# Patient Record
Sex: FEMALE | Race: ASIAN | NOT HISPANIC OR LATINO | URBAN - METROPOLITAN AREA
[De-identification: names, ages, dates, MRNs, and addresses within clinical notes are randomized per-mention and may not be internally consistent; named-entity substitution may affect disease eponyms.]

---
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
## 2022-04-24 ENCOUNTER — EMERGENCY (EMERGENCY)
Facility: HOSPITAL | Age: 22
LOS: 0 days | Discharge: HOME | End: 2022-04-25
Attending: EMERGENCY MEDICINE
Payer: MEDICAID

## 2022-04-24 VITALS
SYSTOLIC BLOOD PRESSURE: 102 MMHG | DIASTOLIC BLOOD PRESSURE: 52 MMHG | RESPIRATION RATE: 18 BRPM | WEIGHT: 108.03 LBS | HEART RATE: 71 BPM | TEMPERATURE: 98 F | OXYGEN SATURATION: 99 % | HEIGHT: 65 IN

## 2022-04-24 DIAGNOSIS — N83.202 UNSPECIFIED OVARIAN CYST, LEFT SIDE: ICD-10-CM

## 2022-04-24 DIAGNOSIS — J45.909 UNSPECIFIED ASTHMA, UNCOMPLICATED: ICD-10-CM

## 2022-04-24 DIAGNOSIS — R10.30 LOWER ABDOMINAL PAIN, UNSPECIFIED: ICD-10-CM

## 2022-04-24 DIAGNOSIS — R11.0 NAUSEA: ICD-10-CM

## 2022-04-24 LAB
ALBUMIN SERPL ELPH-MCNC: 3.8 G/DL — SIGNIFICANT CHANGE UP (ref 3.3–5)
ALP SERPL-CCNC: 57 U/L — SIGNIFICANT CHANGE UP (ref 40–120)
ALT FLD-CCNC: 32 U/L — SIGNIFICANT CHANGE UP (ref 12–78)
ANION GAP SERPL CALC-SCNC: 8 MMOL/L — SIGNIFICANT CHANGE UP (ref 5–17)
APPEARANCE UR: CLEAR — SIGNIFICANT CHANGE UP
AST SERPL-CCNC: 18 U/L — SIGNIFICANT CHANGE UP (ref 15–37)
BASOPHILS # BLD AUTO: 0.04 K/UL — SIGNIFICANT CHANGE UP (ref 0–0.2)
BASOPHILS NFR BLD AUTO: 0.4 % — SIGNIFICANT CHANGE UP (ref 0–2)
BILIRUB SERPL-MCNC: 0.4 MG/DL — SIGNIFICANT CHANGE UP (ref 0.2–1.2)
BILIRUB UR-MCNC: NEGATIVE — SIGNIFICANT CHANGE UP
BUN SERPL-MCNC: 17 MG/DL — SIGNIFICANT CHANGE UP (ref 7–23)
CALCIUM SERPL-MCNC: 8.9 MG/DL — SIGNIFICANT CHANGE UP (ref 8.5–10.1)
CHLORIDE SERPL-SCNC: 108 MMOL/L — SIGNIFICANT CHANGE UP (ref 96–108)
CO2 SERPL-SCNC: 26 MMOL/L — SIGNIFICANT CHANGE UP (ref 22–31)
COLOR SPEC: YELLOW — SIGNIFICANT CHANGE UP
CREAT SERPL-MCNC: 0.77 MG/DL — SIGNIFICANT CHANGE UP (ref 0.5–1.3)
DIFF PNL FLD: NEGATIVE — SIGNIFICANT CHANGE UP
EGFR: 113 ML/MIN/1.73M2 — SIGNIFICANT CHANGE UP
EOSINOPHIL # BLD AUTO: 0.17 K/UL — SIGNIFICANT CHANGE UP (ref 0–0.5)
EOSINOPHIL NFR BLD AUTO: 1.5 % — SIGNIFICANT CHANGE UP (ref 0–6)
GLUCOSE SERPL-MCNC: 112 MG/DL — HIGH (ref 70–99)
GLUCOSE UR QL: NEGATIVE — SIGNIFICANT CHANGE UP
HCT VFR BLD CALC: 38.4 % — SIGNIFICANT CHANGE UP (ref 34.5–45)
HGB BLD-MCNC: 12.9 G/DL — SIGNIFICANT CHANGE UP (ref 11.5–15.5)
IMM GRANULOCYTES NFR BLD AUTO: 0.3 % — SIGNIFICANT CHANGE UP (ref 0–1.5)
KETONES UR-MCNC: NEGATIVE — SIGNIFICANT CHANGE UP
LEUKOCYTE ESTERASE UR-ACNC: ABNORMAL
LYMPHOCYTES # BLD AUTO: 19.3 % — SIGNIFICANT CHANGE UP (ref 13–44)
LYMPHOCYTES # BLD AUTO: 2.18 K/UL — SIGNIFICANT CHANGE UP (ref 1–3.3)
MCHC RBC-ENTMCNC: 31 PG — SIGNIFICANT CHANGE UP (ref 27–34)
MCHC RBC-ENTMCNC: 33.6 GM/DL — SIGNIFICANT CHANGE UP (ref 32–36)
MCV RBC AUTO: 92.3 FL — SIGNIFICANT CHANGE UP (ref 80–100)
MONOCYTES # BLD AUTO: 0.59 K/UL — SIGNIFICANT CHANGE UP (ref 0–0.9)
MONOCYTES NFR BLD AUTO: 5.2 % — SIGNIFICANT CHANGE UP (ref 2–14)
NEUTROPHILS # BLD AUTO: 8.29 K/UL — HIGH (ref 1.8–7.4)
NEUTROPHILS NFR BLD AUTO: 73.3 % — SIGNIFICANT CHANGE UP (ref 43–77)
NITRITE UR-MCNC: NEGATIVE — SIGNIFICANT CHANGE UP
PH UR: 6 — SIGNIFICANT CHANGE UP (ref 5–8)
PLATELET # BLD AUTO: 298 K/UL — SIGNIFICANT CHANGE UP (ref 150–400)
POTASSIUM SERPL-MCNC: 3.6 MMOL/L — SIGNIFICANT CHANGE UP (ref 3.5–5.3)
POTASSIUM SERPL-SCNC: 3.6 MMOL/L — SIGNIFICANT CHANGE UP (ref 3.5–5.3)
PROT SERPL-MCNC: 6.9 GM/DL — SIGNIFICANT CHANGE UP (ref 6–8.3)
PROT UR-MCNC: NEGATIVE — SIGNIFICANT CHANGE UP
RBC # BLD: 4.16 M/UL — SIGNIFICANT CHANGE UP (ref 3.8–5.2)
RBC # FLD: 11.9 % — SIGNIFICANT CHANGE UP (ref 10.3–14.5)
SODIUM SERPL-SCNC: 142 MMOL/L — SIGNIFICANT CHANGE UP (ref 135–145)
SP GR SPEC: 1.01 — SIGNIFICANT CHANGE UP (ref 1.01–1.02)
UROBILINOGEN FLD QL: NEGATIVE — SIGNIFICANT CHANGE UP
WBC # BLD: 11.3 K/UL — HIGH (ref 3.8–10.5)
WBC # FLD AUTO: 11.3 K/UL — HIGH (ref 3.8–10.5)

## 2022-04-24 PROCEDURE — 99285 EMERGENCY DEPT VISIT HI MDM: CPT

## 2022-04-24 PROCEDURE — 74177 CT ABD & PELVIS W/CONTRAST: CPT | Mod: MA

## 2022-04-24 PROCEDURE — 99284 EMERGENCY DEPT VISIT MOD MDM: CPT | Mod: 25

## 2022-04-24 PROCEDURE — 86901 BLOOD TYPING SEROLOGIC RH(D): CPT

## 2022-04-24 PROCEDURE — 76830 TRANSVAGINAL US NON-OB: CPT

## 2022-04-24 PROCEDURE — 86850 RBC ANTIBODY SCREEN: CPT

## 2022-04-24 PROCEDURE — 87086 URINE CULTURE/COLONY COUNT: CPT

## 2022-04-24 PROCEDURE — 81001 URINALYSIS AUTO W/SCOPE: CPT

## 2022-04-24 PROCEDURE — 96374 THER/PROPH/DIAG INJ IV PUSH: CPT

## 2022-04-24 PROCEDURE — 85025 COMPLETE CBC W/AUTO DIFF WBC: CPT

## 2022-04-24 PROCEDURE — 96361 HYDRATE IV INFUSION ADD-ON: CPT

## 2022-04-24 PROCEDURE — 86900 BLOOD TYPING SEROLOGIC ABO: CPT

## 2022-04-24 PROCEDURE — 80053 COMPREHEN METABOLIC PANEL: CPT

## 2022-04-24 PROCEDURE — 74177 CT ABD & PELVIS W/CONTRAST: CPT | Mod: 26,MA

## 2022-04-24 PROCEDURE — 96375 TX/PRO/DX INJ NEW DRUG ADDON: CPT

## 2022-04-24 PROCEDURE — 93975 VASCULAR STUDY: CPT

## 2022-04-24 PROCEDURE — 36415 COLL VENOUS BLD VENIPUNCTURE: CPT

## 2022-04-24 RX ORDER — KETOROLAC TROMETHAMINE 30 MG/ML
30 SYRINGE (ML) INJECTION ONCE
Refills: 0 | Status: DISCONTINUED | OUTPATIENT
Start: 2022-04-24 | End: 2022-04-24

## 2022-04-24 RX ORDER — ONDANSETRON 8 MG/1
4 TABLET, FILM COATED ORAL ONCE
Refills: 0 | Status: COMPLETED | OUTPATIENT
Start: 2022-04-24 | End: 2022-04-24

## 2022-04-24 RX ORDER — SODIUM CHLORIDE 9 MG/ML
1000 INJECTION INTRAMUSCULAR; INTRAVENOUS; SUBCUTANEOUS ONCE
Refills: 0 | Status: COMPLETED | OUTPATIENT
Start: 2022-04-24 | End: 2022-04-24

## 2022-04-24 RX ADMIN — SODIUM CHLORIDE 1000 MILLILITER(S): 9 INJECTION INTRAMUSCULAR; INTRAVENOUS; SUBCUTANEOUS at 21:57

## 2022-04-24 RX ADMIN — ONDANSETRON 4 MILLIGRAM(S): 8 TABLET, FILM COATED ORAL at 21:57

## 2022-04-24 RX ADMIN — Medication 30 MILLIGRAM(S): at 22:31

## 2022-04-24 NOTE — ED STATDOCS - GASTROINTESTINAL, MLM
abdomen soft, +suprapubic tenderness and rebound tenderness and non-distended. Bowel sounds present.

## 2022-04-24 NOTE — ED STATDOCS - NSICDXPASTMEDICALHX_GEN_ALL_CORE_FT
PAST MEDICAL HISTORY:  Asthma     COVID-19 vaccine series not completed     Deviated septum     Ethmoid sinusitis     Heart murmur childhood

## 2022-04-24 NOTE — ED STATDOCS - NSFOLLOWUPCLINICS_GEN_ALL_ED_FT
[facility]  Family Medicine  [address] [place] [address]  Phone: [phone]  Fax:

## 2022-04-24 NOTE — ED ADULT TRIAGE NOTE - CHIEF COMPLAINT QUOTE
pt c/o lower abdominal pain since this afternoon. pt endorses nausea without vomiting. pt states last menstrual period ended four days ago. pt ambulatory in triage. no signs of distress noted. no medication taken PTA. pt eating and drinking normally.

## 2022-04-24 NOTE — ED ADULT NURSE NOTE - OBJECTIVE STATEMENT
Pt. reports to ED for abdominal pain. Pt. reports pain recently worsened today, lower abdominal pain denies radiation, c/o nausea denies vomiting. Reports last period 4 days ago.  Denies pain urination.  PT. took tylenol at home for pain at 6PM

## 2022-04-24 NOTE — ED ADULT NURSE NOTE - NSICDXPASTSURGICALHX_GEN_ALL_CORE_FT
"Subjective:   Zari Day is a 54 y.o. female who presents for Foot Pain (heal pain on R heal pt states she woke that way yesterday, cant put weight on it)  Patient complains of right heel pain started a day after she wore a boot instead of sandals and that could be causing this pain patient is having excruciating pain when she walks on it and stands on it      Foot Problem   This is a new problem. The current episode started yesterday. The problem occurs constantly. The problem has been gradually worsening. Associated symptoms include arthralgias. Pertinent negatives include no fever, myalgias or rash. She has tried NSAIDs for the symptoms. The treatment provided mild relief.     Review of Systems   Constitutional: Negative for fever.   Musculoskeletal: Positive for arthralgias and joint pain. Negative for myalgias.   Skin: Negative for rash.   All other systems reviewed and are negative.    No Known Allergies   Objective:   /74   Pulse 89   Temp 36.7 °C (98.1 °F)   Resp 16   Ht 1.727 m (5' 8\")   Wt 71.2 kg (157 lb)   LMP 05/05/2017   SpO2 98%   BMI 23.87 kg/m²   Physical Exam   Constitutional: She is oriented to person, place, and time. She appears well-developed and well-nourished. No distress.   HENT:   Head: Normocephalic and atraumatic.   Eyes: Pupils are equal, round, and reactive to light. Conjunctivae and EOM are normal.   Cardiovascular: Normal rate and regular rhythm.   Pulmonary/Chest: Effort normal and breath sounds normal.   Musculoskeletal: Normal range of motion. She exhibits tenderness.   Rt foot-tend on heel, no distal neurovascular abn, ROM of ankle WNL   Neurological: She is alert and oriented to person, place, and time. No sensory deficit.   Skin: Skin is warm and dry.   Psychiatric: She has a normal mood and affect. Thought content normal.   Vitals reviewed.        Assessment/Plan:   1. Right foot pain  - predniSONE (DELTASONE) 20 MG Tab; Take 1 Tab by mouth every day for 7 " days.  Dispense: 7 Tab; Refill: 0    Other orders  - vitamin D (CHOLECALCIFEROL) 1000 UNIT Tab; Take 1,000 Units by mouth every day.  Patient will be tried on prednisone which she is taking 800 of high-dose ibuprofen without much relief that should help her with the inflammation  Differential diagnosis, natural history, supportive care, and indications for immediate follow-up discussed.          PAST SURGICAL HISTORY:  No significant past surgical history

## 2022-04-24 NOTE — ED STATDOCS - OBJECTIVE STATEMENT
21 y/o female with a PMHx of asthma presents to the ED with lower abdominal pain, exacerbated by movement starting at 4 pm today. Pt reports she has nausea, with no vomiting. No urinary complaints. No documented fevers. Pt took Tylenol with no relief. MP: Ended 4 days ago. No hx of ovarian cyst.

## 2022-04-24 NOTE — ED STATDOCS - NSFOLLOWUPINSTRUCTIONS_ED_ALL_ED_FT
Take ibuprofen or aleve as needed for pain.  See a gynecologist within 1 week.  You will need your ultrasound repeated again within 3 months.                                                                                                         Ovarian Cyst       An ovarian cyst is a fluid-filled sac that forms on an ovary. The ovaries are small organs that produce eggs in women. Various types of cysts can form on the ovaries. Some may cause symptoms and require treatment. Most ovarian cysts go away on their own, are not cancerous (are benign), and do not cause problems.      What are the causes?    Ovarian cysts may be caused by:  •Ovarian hyperstimulation syndrome. This is a condition that can develop from taking fertility medicines. It causes multiple large ovarian cysts to form.      •Polycystic ovarian syndrome (PCOS). This is a common hormonal disorder that can cause ovarian cysts to form, and can cause problems with your period or fertility.      •The normal menstrual cycle.        What increases the risk?    The following factors may make you more likely to develop this condition:  •Being overweight or obese.      •Taking fertility medicines.      •Taking certain forms of hormonal birth control.      •Smoking.        What are the signs or symptoms?    Many ovarian cysts do not cause symptoms. If symptoms are present, they may include:  •Pelvic pain or pressure.      •Pain in the lower abdomen.      •Pain during sex.      •Abdominal swelling.      •Abnormal menstrual periods.      •Increasing pain with menstrual periods.        How is this diagnosed?    These cysts are commonly found during a routine pelvic exam. You may have tests to find out more about the cyst, such as:  •Ultrasound.      •CT scan.      •MRI.      •Blood tests.        How is this treated?    Many ovarian cysts go away on their own without treatment. Your health care provider may want to check your cyst regularly for 2–3 months to see if it changes. If you are in menopause, it is especially important to have your cyst monitored closely because menopausal women have a higher rate of ovarian cancer.    When treatment is needed, it may include:  •Medicines to help relieve pain.      •A procedure to drain the cyst (aspiration).      •Surgery to remove the whole cyst (cystectomy).      •Hormone treatment or birth control pills. These methods are sometimes used to help keep cysts from coming back.      •Surgery to remove the ovary (oophorectomy).        Follow these instructions at home:    •Take over-the-counter and prescription medicines only as told by your health care provider.      •Ask your health care provider if any medicine prescribed to you requires you to avoid driving or using machinery.      •Get regular pelvic exams and Pap tests as often as told by your health care provider.      •Return to your normal activities as told by your health care provider. Ask your health care provider what activities are safe for you.      • Do not use any products that contain nicotine or tobacco, such as cigarettes, e-cigarettes, and chewing tobacco. If you need help quitting, ask your health care provider.      •Keep all follow-up visits. This is important.        Contact a health care provider if:    •Your periods are late, irregular, painful, or they stop.      •You have pelvic pain that does not go away.      •You have pressure on your bladder or trouble emptying your bladder completely.    •You have any of the following:  •A feeling of fullness.      •You are gaining weight or losing weight without changing your exercise and eating habits.      •Pain, swelling, or bloating in the abdomen.      •Loss of appetite.      •Pain and pressure in your back and pelvis.        •You think you may be pregnant.        Get help right away if:    •You have abdominal or pelvic pain that is severe or gets worse.      •You cannot eat or drink without vomiting.      •You suddenly develop a fever or chills.      •Your menstrual period is much heavier than usual.        Summary    •An ovarian cyst is a fluid-filled sac that forms on an ovary.      •Some ovarian cysts may cause symptoms and require treatment.      •These cysts are commonly found during a routine pelvic exam.      •Many ovarian cysts go away on their own without treatment.      This information is not intended to replace advice given to you by your health care provider. Make sure you discuss any questions you have with your health care provider.      Document Revised: 05/27/2021 Document Reviewed: 05/27/2021    [employer] Patient Education © 2022 [employer]

## 2022-04-24 NOTE — ED STATDOCS - CLINICAL SUMMARY MEDICAL DECISION MAKING FREE TEXT BOX
22 with lower abdominal pain and tenderness, will get labs, urine, CT scan to rule out appendicitis.

## 2022-04-24 NOTE — ED STATDOCS - CARE PROVIDER_API CALL
Cris Campo)  Obstetrics and Gynecology  [address] [place] [address]  Phone: [phone]  Fax: [phone]  Follow Up Time:

## 2022-04-24 NOTE — ED STATDOCS - PATIENT PORTAL LINK FT
You can access the FollowMyHealth Patient Portal offered by [facility] by registering at the following website: [email or web address]. By joining [employer]’s FollowMyHealth portal, you will also be able to view your health information using other applications (apps) compatible with our system.

## 2022-04-24 NOTE — ED STATDOCS - PROGRESS NOTE DETAILS
Pain improved; patient states pain is gone.  Repeat abd exam without any tenderness or rebound.  Toradol helped with pain.  CT showing 5cm ovarian cyst.  US ordered to check flow to ovaries.  Patient has never had a gynecologic exam before and does not have a gynecologist. Will discuss case with gyn on call. Discussed case with Gyn resident who agrees with management and plan.  If pain improved without vomiting and normal flow on US, will refer patient for first gyn outpatient visit this week.

## 2022-04-25 VITALS
SYSTOLIC BLOOD PRESSURE: 111 MMHG | TEMPERATURE: 98 F | HEART RATE: 70 BPM | RESPIRATION RATE: 16 BRPM | OXYGEN SATURATION: 100 % | DIASTOLIC BLOOD PRESSURE: 69 MMHG

## 2022-04-25 PROBLEM — J32.2 CHRONIC ETHMOIDAL SINUSITIS: Chronic | Status: ACTIVE | Noted: 2021-06-09

## 2022-04-25 PROBLEM — Z28.9 IMMUNIZATION NOT CARRIED OUT FOR UNSPECIFIED REASON: Chronic | Status: ACTIVE | Noted: 2021-06-09

## 2022-04-25 PROBLEM — R01.1 CARDIAC MURMUR, UNSPECIFIED: Chronic | Status: ACTIVE | Noted: 2021-06-09

## 2022-04-25 PROBLEM — J34.2 DEVIATED NASAL SEPTUM: Chronic | Status: ACTIVE | Noted: 2021-06-09

## 2022-04-25 PROCEDURE — 93975 VASCULAR STUDY: CPT | Mod: 26

## 2022-04-25 PROCEDURE — 76830 TRANSVAGINAL US NON-OB: CPT | Mod: 26

## 2022-04-25 RX ADMIN — SODIUM CHLORIDE 1000 MILLILITER(S): 9 INJECTION INTRAMUSCULAR; INTRAVENOUS; SUBCUTANEOUS at 02:35

## 2022-04-25 RX ADMIN — Medication 30 MILLIGRAM(S): at 02:35

## 2022-04-26 LAB
CULTURE RESULTS: SIGNIFICANT CHANGE UP
SPECIMEN SOURCE: SIGNIFICANT CHANGE UP

## 2022-08-01 NOTE — ED ADULT NURSE NOTE - CAS ELECT INFOMATION PROVIDED
A second phone call was made out to the number in Epic to check patient in for their virtual appointment today. Patient's grandfather, Fadi, answered and said patient no longer lives there. Writer asked if patient has a phone number they can provide and Fadi said patient doesn't have a phone. Writer was unable to get a hold of patient to check them in for their virtual appointment today.  
Patient has a virtual EVITA eval today, 8/1/2022 at 1300. A phone call was made out to patient to check them in for this appointment, but no answer so a voice message was left for patient to return call.  
DC instructions

## 2023-12-24 NOTE — H&P PST ADULT - COMFORT LEVEL, ACCEPTABLE
0 decreased ability to use arms for pushing/pulling/decreased ability to use legs for bridging/pushing

## 2024-09-24 NOTE — ED STATDOCS - CPE ED MUSC NORM
What to expect when recovering from your EGD and Colonoscopy    For your procedure today you received sedation.  Please refer to the handout regarding your specific sedation.    Possible side-effects after your EGD and Colonoscopy    Nausea may occur.  If you have nausea:    Take sips of white soda, juice, tea or water.   Eat smaller meals (avoid any fatty or deep fried foods).  If nausea lasts more than 24 hours, call your doctor.    After your colonoscopy it is common to have gas pains, bloating and cramping.  Try the following to relieve your discomfort/pain.   Walk around; activity will help get rid of this gas.  Lie on your left side with your knees bent up.  You may take acetaminophen (Tylenol) unless instructed otherwise.     A sore throat is common after your procedure.  If you have a sore throat, pain or discomfort:  Gargle with a warm salt water rinse  Try throat lozenges.  You may take acetaminophen (Tylenol) unless instructed otherwise.     Call your doctor if you have any of the following:  Signs of bleeding include:  A large amount of rectal bleeding (the toilet water is colored red).  Dark, black, or maroon colored stools.  If you start vomiting blood or a coffee ground-like material.  Signs of infection include:  Temperature over 101 degrees F. and/or chills.  Redness or warmth around IV site.  Your abdomen is rounded and/or hard.  You have severe pain in the abdomen.    Diet Instructions:   You may resume your normal diet.   You may notice more belching or burping than usual after your EGD; this is normal and should go away in a day or so.    The medication you received today may cause dizziness, drowsiness and impaired judgment  Take it easy for the rest of today.  You should not drive, operate heavy or potentially harmful equipment, make important legal decisions, or drink alcohol for 24 hours after your receive sedation or anesthesia.   Rise slowly from a sitting or lying position, the  medications you received can cause you to become lightheaded or dizzy.    What to expect after a colonoscopy:   You may notice a small amount of blood on toilet paper after wiping for the next couple of days; this is normal.  You may continue to have loose stools for up to 24-48 hours after your colonoscopy; this is normal  You may feel sore, stiff, or have slight bruising on your arm(s) from tape, blood pressure cuffs, or the IV site.  This is normal and should go away in a few days.    You may continue taking the medication that you usually take at home. Start Carafate 4 times daily x 2 weeks. Start omeprazole 20 mg daily.    Avoid medications that contain Aspirin, ibuprofen and Aleve.      Findings Endoscopy:   Erosive esophagitis with ulceration without bleeding  Small  sliding-type hiatal hernia  Small gastric nonbleeding ulcer    Findings Colonoscopy:   Normal colon and rectum  Moderate to severe sigmoid colon diverticulosis  Three colon polyps removed    Handouts given:     Additional information/questions:   Repeat colonoscopy in 5 years secondary to history of polyps and family history of colon cancer.  Follow up EGD timing dependent on pathology  Dr. Clark's office will call you with your biopsy results within 7 days.  Please call your doctor’s office, if you have not heard from them in 7 days.    If you have any questions/concerns before this time please feel free to call the GI lab at [facility] [place] [phone].   In case of emergency, please go to the nearest emergency room for care.       Want to Say “Thank You” to a Nurse?  The [employer] Award® was created in memory of KEN Casper by his family to say thank you to nurses who provide an outstanding level of care.    Submit a nomination using any method below.     OR    [email or web address]  Or visit the Resource section   on your [employer] [place]        normal...

## 2025-05-28 NOTE — ED ADULT NURSE NOTE - NSICDXPASTMEDICALHX_GEN_ALL_CORE_FT
Patient Education     Well Child Exam 11 to 14 Years   About this topic   Your child's well child exam is a visit with the doctor to check your child's health. The doctor measures your child's weight and height, and may measure your child's body mass index (BMI). The doctor plots these numbers on a growth curve. The growth curve gives a picture of your child's growth at each visit. The doctor may listen to your child's heart, lungs, and belly. Your doctor will do a full exam of your child from the head to the toes.  Your child may also need shots or blood tests during this visit.  General   Growth and Development   Your doctor will ask you how your child is developing. The doctor will focus on the skills that most children your child's age are expected to do. During this time of your child's life, here are some things you can expect.  Physical development - Your child may:  Show signs of maturing physically  Need reminders about drinking water when playing  Be a little clumsy while growing  Hearing, seeing, and talking - Your child may:  Be able to see the long-term effects of actions  Understand many viewpoints  Begin to question and challenge existing rules  Want to help set household rules  Feelings and behavior - Your child may:  Want to spend time alone or with friends rather than with family  Have an interest in dating and the opposite sex  Value the opinions of friends over parents' thoughts or ideas  Want to push the limits of what is allowed  Believe bad things won’t happen to them  Feeding - Your child needs:  To learn to make healthy choices when eating. Serve healthy foods like lean meats, fruits, vegetables, and whole grains. Help your child choose healthy foods when out to eat.  To start each day with a healthy breakfast  To limit soda, chips, candy, and foods that are high in fats and sugar  Healthy snacks available like fruit, cheese and crackers, or peanut butter  To eat meals as a part of the  family. Turn the TV and cell phones off while eating. Talk about your day, rather than focusing on what your child is eating.  Sleep - Your child:  Needs more sleep  Is likely sleeping about 8 to 10 hours in a row at night  Should be allowed to read each night before bed. Have your child brush and floss the teeth before going to bed as well.  Should limit TV and computers for the hour before bedtime  Keep cell phones, tablets, televisions, and other electronic devices out of bedrooms overnight. They interfere with sleep.  Needs a routine to make week nights easier. Encourage your child to get up at a normal time on weekends instead of sleeping late.  Shots or vaccines - It is important for your child to get shots on time. This protects your child from very serious illnesses like pneumonia, blood and brain infections, tetanus, flu, or cancer. Your child may need:  HPV or human papillomavirus vaccine  Tdap or tetanus, diphtheria, and pertussis vaccine  Meningococcal vaccine  Influenza vaccine  COVID-19 vaccine  Help for Parents   Activities.  Encourage your child to spend at least 1 hour each day being physically active.  Offer your child a variety of activities to take part in. Include music, sports, arts and crafts, and other things your child is interested in. Take care not to over schedule your child. One to 2 activities a week outside of school is often a good number for your child.  Make sure your child wears a helmet when using anything with wheels like skates, skateboard, bike, etc.  Encourage time spent with friends. Provide a safe area for this.  Here are some things you can do to help keep your child safe and healthy.  Talk to your child about the dangers of smoking, drinking alcohol, and using drugs. Do not allow anyone to smoke in your home or around your child.  Make sure your child uses a seat belt when riding in the car. Your child should ride in the back seat until 13 years of age.  Talk with your  child about peer pressure. Help your child learn how to handle risky things friends may want to do.  Remind your child to use headphones responsibly. Limit how loud the volume is turned up. Never wear headphones, text, or use a cell phone while riding a bike or crossing the street.  Protect your child from gun injuries. If you have a gun, use a trigger lock. Keep the gun locked up and the bullets kept in a separate place.  Limit screen time for children to 1 to 2 hours per day. This includes TV, phones, computers, and video games.  Discuss social media safety  Parents need to think about:  Monitoring your child's computer use, especially when on the Internet  How to keep open lines of communication about unwanted touch, sex, and dating  How to continue to talk about puberty  Having your child help with some family chores to encourage responsibility within the family  Helping children make healthy choices  The next well child visit will most likely be in 1 year. At this visit, your doctor may:  Do a full check up on your child  Talk about school, friends, and social skills  Talk about sexuality and sexually transmitted diseases  Talk about driving and safety  When do I need to call the doctor?   Fever of 100.4°F (38°C) or higher  Your child has not started puberty by age 14  Low mood, suddenly getting poor grades, or missing school  You are worried about your child's development  Last Reviewed Date   2021-11-04  Consumer Information Use and Disclaimer   This generalized information is a limited summary of diagnosis, treatment, and/or medication information. It is not meant to be comprehensive and should be used as a tool to help the user understand and/or assess potential diagnostic and treatment options. It does NOT include all information about conditions, treatments, medications, side effects, or risks that may apply to a specific patient. It is not intended to be medical advice or a substitute for the medical  advice, diagnosis, or treatment of a health care provider based on the health care provider's examination and assessment of a patient’s specific and unique circumstances. Patients must speak with a health care provider for complete information about their health, medical questions, and treatment options, including any risks or benefits regarding use of medications. This information does not endorse any treatments or medications as safe, effective, or approved for treating a specific patient. [employer] and its affiliates disclaim any warranty or liability relating to this information or the use thereof. The use of this information is governed by the Terms of Use, available at https://www.[employer].com/en/know/clinical-effectiveness-terms   Copyright   Copyright © 2024 [employer] and its affiliates and/or licensors. All rights reserved.     PAST MEDICAL HISTORY:  Asthma     COVID-19 vaccine series not completed     Deviated septum     Ethmoid sinusitis     Heart murmur childhood